# Patient Record
Sex: MALE | Race: WHITE | NOT HISPANIC OR LATINO | Employment: FULL TIME | URBAN - METROPOLITAN AREA
[De-identification: names, ages, dates, MRNs, and addresses within clinical notes are randomized per-mention and may not be internally consistent; named-entity substitution may affect disease eponyms.]

---

## 2020-06-24 ENCOUNTER — OFFICE VISIT (OUTPATIENT)
Dept: MEDICAL GROUP | Facility: PHYSICIAN GROUP | Age: 25
End: 2020-06-24
Payer: COMMERCIAL

## 2020-06-24 VITALS
DIASTOLIC BLOOD PRESSURE: 68 MMHG | WEIGHT: 144 LBS | HEIGHT: 71 IN | HEART RATE: 74 BPM | RESPIRATION RATE: 18 BRPM | TEMPERATURE: 98.6 F | OXYGEN SATURATION: 97 % | BODY MASS INDEX: 20.16 KG/M2 | SYSTOLIC BLOOD PRESSURE: 118 MMHG

## 2020-06-24 DIAGNOSIS — G89.29 CHRONIC PAIN OF RIGHT WRIST: ICD-10-CM

## 2020-06-24 DIAGNOSIS — Z13.228 SCREENING FOR ENDOCRINE, METABOLIC AND IMMUNITY DISORDER: ICD-10-CM

## 2020-06-24 DIAGNOSIS — Z76.89 ENCOUNTER TO ESTABLISH CARE: ICD-10-CM

## 2020-06-24 DIAGNOSIS — Z13.220 SCREENING FOR LIPID DISORDERS: ICD-10-CM

## 2020-06-24 DIAGNOSIS — Z13.0 SCREENING FOR ENDOCRINE, METABOLIC AND IMMUNITY DISORDER: ICD-10-CM

## 2020-06-24 DIAGNOSIS — Z00.00 PREVENTATIVE HEALTH CARE: ICD-10-CM

## 2020-06-24 DIAGNOSIS — M25.531 CHRONIC PAIN OF RIGHT WRIST: ICD-10-CM

## 2020-06-24 DIAGNOSIS — Z13.29 SCREENING FOR ENDOCRINE, METABOLIC AND IMMUNITY DISORDER: ICD-10-CM

## 2020-06-24 PROCEDURE — 99204 OFFICE O/P NEW MOD 45 MIN: CPT | Performed by: NURSE PRACTITIONER

## 2020-06-24 SDOH — HEALTH STABILITY: MENTAL HEALTH: HOW OFTEN DO YOU HAVE A DRINK CONTAINING ALCOHOL?: 2-3 TIMES A WEEK

## 2020-06-24 SDOH — HEALTH STABILITY: MENTAL HEALTH: HOW OFTEN DO YOU HAVE 6 OR MORE DRINKS ON ONE OCCASION?: NEVER

## 2020-06-24 SDOH — HEALTH STABILITY: MENTAL HEALTH: HOW MANY STANDARD DRINKS CONTAINING ALCOHOL DO YOU HAVE ON A TYPICAL DAY?: 5 OR 6

## 2020-06-24 ASSESSMENT — PATIENT HEALTH QUESTIONNAIRE - PHQ9: CLINICAL INTERPRETATION OF PHQ2 SCORE: 0

## 2020-06-24 NOTE — ASSESSMENT & PLAN NOTE
New problem to examiner.  Patient reports ongoing right wrist pain for 6 years.  The pain is daily.  The pain is worsened by movement and with his job as a .  He is not taking medication for this.  He is smoking marijuana daily which does help with the pain.  Denies any recent trauma or falls.  He does have history of right wrist fracture in 2014 and had surgery.  He reports that he still has 2 titanium plates and 10 screws in place.  He has not followed up with his orthopedic doctor since his surgery in 2014.

## 2020-06-24 NOTE — PROGRESS NOTES
Subjective:     CC:  Diagnoses of Encounter to establish care, Chronic pain of right wrist, Preventative health care, Screening for lipid disorders, and Screening for endocrine, metabolic and immunity disorder were pertinent to this visit.    HISTORY OF THE PRESENT ILLNESS: Patient is a 24 y.o. male. This pleasant patient is here today to establish care and discuss the following.  He has not had a PCP since he was with his pediatrician.    Chronic pain of right wrist  New problem to examiner.  Patient reports ongoing right wrist pain for 6 years.  The pain is daily.  The pain is worsened by movement and with his job as a .  He is not taking medication for this.  He is smoking marijuana daily which does help with the pain.  Denies any recent trauma or falls.  He does have history of right wrist fracture in  and had surgery.  He reports that he still has 2 titanium plates and 10 screws in place.  He has not followed up with his orthopedic doctor since his surgery in .    Allergies: Morphine    No current Epic-ordered outpatient medications on file.     No current Epic-ordered facility-administered medications on file.        Past Medical History:   Diagnosis Date   • Wound, open, trachea 2011       Past Surgical History:   Procedure Laterality Date   • WRIST ORIF  2014    Performed by Eliezer Ortega M.D. at SURGERY [facility]       Social History     Tobacco Use   • Smoking status: Former Smoker     Packs/day: 0.50     Years: 8.00     Pack years: 4.00     Types: Cigarettes     Last attempt to quit: 3/24/2020     Years since quittin.2   • Smokeless tobacco: Never Used   Substance Use Topics   • Alcohol use: Yes     Frequency: 2-3 times a week     Drinks per session: 5 or 6     Binge frequency: Never     Comment: no more than 6 pack of beer on weekends   • Drug use: Yes     Frequency: 7.0 times per week     Types: Inhaled, Marijuana     Comment: marijuana, helps with wrist  "      Social History     Social History Narrative    ** Merged History Encounter **         ** Merged History Encounter **            Family History   Problem Relation Age of Onset   • No Known Problems Mother    • No Known Problems Father    • No Known Problems Sister    • No Known Problems Brother    • Asthma Maternal Grandmother    • Other Maternal Grandmother         home oxygen   • Hypertension Maternal Grandfather    • Diabetes Maternal Grandfather    • Hyperlipidemia Maternal Grandfather    • No Known Problems Sister    • No Known Problems Brother    • No Known Problems Brother        Health Maintenance: due for vaccines    ROS:   Gen: no fevers/chills, no changes in weight  Eyes: no changes in vision  ENT: no sore throat, no ear pain  Pulm: no sob, no cough  CV: no chest pain, no palpitations  GI: no nausea/vomiting, no diarrhea  : no dysuria  MSk: no myalgias, +chronic right wrist pain  Skin: no rash  Neuro: no headaches, no dizziness  Heme/Lymph: no easy bruising      Objective:     Vital signs reviewed  Exam: /68 (BP Location: Left arm, Patient Position: Sitting, BP Cuff Size: Adult)   Pulse 74   Temp 37 °C (98.6 °F) (Temporal)   Resp 18   Ht 1.803 m (5' 11\")   Wt 65.3 kg (144 lb)   SpO2 97%  Body mass index is 20.08 kg/m².    General: Normal appearing. No distress.  HENT: Normocephalic. Ears normal shape and contour, canals are clear bilaterally, tympanic membranes are benign.  Eyes: Eyes conjunctiva clear lids without ptosis, pupils equal and reactive to light accommodation, lids normal.  Neck: Supple without JVD. Thyroid is not enlarged.  Pulmonary: Clear to ausculation.  Normal effort. No rales, ronchi, or wheezing.  Cardiovascular: Regular rate and rhythm without murmur. Radial pulses are intact and equal bilaterally.  Abdomen: Soft, nontender, nondistended. Normal bowel sounds. Liver and spleen are not palpable.  Neurologic: Grossly nonfocal  Lymph: No cervical or supraclavicular lymph " nodes are palpable  Skin: Warm and dry.  No obvious lesions. Healed right wrist surgical scar.  Musculoskeletal: Normal gait. No extremity cyanosis, clubbing, or edema. No pain with palpation to right wrist, no decreased ROM.   Psych: Normal mood and affect. Alert and oriented x3. Judgment and insight is normal.    Assessment & Plan:   24 y.o. male with the following -    1. Encounter to establish care  New problem to examiner. Care established. Patient would like labs ordered.     2. Chronic pain of right wrist  New problem to examiner. Check xray, last was 2014. Referral placed. Ay use OTC Tylenol and Ibuprofen for pain. No acute s/s today. Monitor and follow.   - DX-WRIST-LIMITED 2- RIGHT; Future  - REFERRAL TO ORTHOPEDICS    3. Preventative health care  New problem to examiner. Due for labs. Monitor and follow.   - CBC WITH DIFFERENTIAL; Future  - Comp Metabolic Panel; Future    4. Screening for lipid disorders  New problem to examiner. Screening indicated. Labs ordered. Monitor and follow-up via [facility].   - Lipid Profile; Future    5. Screening for endocrine, metabolic and immunity disorder  New problem to examiner. Screening indicated. Labs ordered. Monitor and follow-up via [facility].   - VITAMIN D,25 HYDROXY; Future  - TSH WITH REFLEX TO FT4; Future      Return in about 1 year (around 6/24/2021), or pending labs.    Please note that this dictation was created using voice recognition software. I have made every reasonable attempt to correct obvious errors, but I expect that there are errors of grammar and possibly content that I did not discover before finalizing the note.

## 2022-09-11 ENCOUNTER — HOSPITAL ENCOUNTER (EMERGENCY)
Facility: MEDICAL CENTER | Age: 27
End: 2022-09-11
Attending: EMERGENCY MEDICINE
Payer: COMMERCIAL

## 2022-09-11 VITALS
DIASTOLIC BLOOD PRESSURE: 70 MMHG | SYSTOLIC BLOOD PRESSURE: 142 MMHG | OXYGEN SATURATION: 98 % | BODY MASS INDEX: 20.96 KG/M2 | HEIGHT: 71 IN | TEMPERATURE: 98.2 F | RESPIRATION RATE: 16 BRPM | WEIGHT: 149.69 LBS | HEART RATE: 74 BPM

## 2022-09-11 DIAGNOSIS — K08.89 PAIN, DENTAL: ICD-10-CM

## 2022-09-11 PROCEDURE — 700102 HCHG RX REV CODE 250 W/ 637 OVERRIDE(OP): Performed by: EMERGENCY MEDICINE

## 2022-09-11 PROCEDURE — 99283 EMERGENCY DEPT VISIT LOW MDM: CPT

## 2022-09-11 PROCEDURE — A9270 NON-COVERED ITEM OR SERVICE: HCPCS | Performed by: EMERGENCY MEDICINE

## 2022-09-11 RX ORDER — HYDROCODONE BITARTRATE AND ACETAMINOPHEN 5; 325 MG/1; MG/1
1 TABLET ORAL EVERY 6 HOURS PRN
Qty: 12 TABLET | Refills: 0 | Status: SHIPPED | OUTPATIENT
Start: 2022-09-11 | End: 2022-09-14

## 2022-09-11 RX ORDER — PENICILLIN V POTASSIUM 500 MG/1
500 TABLET ORAL ONCE
Status: COMPLETED | OUTPATIENT
Start: 2022-09-11 | End: 2022-09-11

## 2022-09-11 RX ORDER — IBUPROFEN 800 MG/1
800 TABLET ORAL EVERY 8 HOURS PRN
Qty: 15 TABLET | Refills: 0 | Status: SHIPPED | OUTPATIENT
Start: 2022-09-11

## 2022-09-11 RX ORDER — OXYCODONE HYDROCHLORIDE AND ACETAMINOPHEN 5; 325 MG/1; MG/1
1 TABLET ORAL ONCE
Status: COMPLETED | OUTPATIENT
Start: 2022-09-11 | End: 2022-09-11

## 2022-09-11 RX ORDER — PENICILLIN V POTASSIUM 500 MG/1
500 TABLET ORAL 4 TIMES DAILY
Qty: 40 TABLET | Refills: 0 | Status: SHIPPED | OUTPATIENT
Start: 2022-09-11

## 2022-09-11 RX ADMIN — PENICILLIN V POTASSIUM 500 MG: 500 TABLET, FILM COATED ORAL at 16:57

## 2022-09-11 RX ADMIN — OXYCODONE AND ACETAMINOPHEN 1 TABLET: 5; 325 TABLET ORAL at 16:57

## 2022-09-11 NOTE — ED NOTES
Patient from lobby to [facility] ambulatory with steady gait accompanied by ED Tech. Chart up for ERP.

## 2022-09-11 NOTE — ED TRIAGE NOTES
"Chief Complaint   Patient presents with    Jaw Pain     Started Friday. R sided jaw pain. Denies fever. 10/10 pain.    Dental Pain     Bottom right dental pain. Per pt, \"painful to chew or swallow anything\".     Swelling on r jaw noted. Pt able to manage secretions.    BP (!) 146/73   Pulse 69   Temp 36.7 °C (98.1 °F) (Temporal)   Resp 14   Ht 1.803 m (5' 11\")   Wt 67.9 kg (149 lb 11.1 oz)   SpO2 98%   BMI 20.88 kg/m²     "

## 2022-09-12 NOTE — ED PROVIDER NOTES
"ED Provider Note    CHIEF COMPLAINT  Chief Complaint   Patient presents with    Jaw Pain     Started Friday. R sided jaw pain. Denies fever. 10/10 pain.    Dental Pain     Bottom right dental pain. Per pt, \"painful to chew or swallow anything\".       HPI  Quirino Miranda is a 27 y.o. male here for evaluation of right-sided jaw pain.  Patient states he has had some swelling to the right lower jaw, over the last couple of days.  He states he does have a broken tooth, but states he has a lot of pain when he chews or swallows.  Patient has no fever chills or vomiting, he is able to open close now without difficulty.  He denies any trauma.  Patient states he has a dentist appointment in the morning.  He is not taking anything prior to arrival for the pain or discomfort.    ROS;  Please see HPI  O/W negative     PAST MEDICAL HISTORY   has a past medical history of Wound, open, trachea (2011).    SOCIAL HISTORY  Social History     Tobacco Use    Smoking status: Former     Packs/day: 0.50     Years: 8.00     Pack years: 4.00     Types: Cigarettes     Quit date: 3/24/2020     Years since quittin.4    Smokeless tobacco: Never   Vaping Use    Vaping Use: Every day   Substance and Sexual Activity    Alcohol use: Yes     Comment: occasionally    Drug use: Not Currently     Frequency: 7.0 times per week     Types: Inhaled, Marijuana     Comment: marijuana, helps with wrist    Sexual activity: Yes     Partners: Female       SURGICAL HISTORY   has a past surgical history that includes orif, wrist (2014).    CURRENT MEDICATIONS  Home Medications       Reviewed by Joe Veronica R.N. (Registered Nurse) on 22 at 1546  Med List Status: Partial     Medication Last Dose Status        Patient Timur Taking any Medications                           ALLERGIES  Allergies   Allergen Reactions    Morphine Shortness of Breath, Nausea and Swelling       REVIEW OF SYSTEMS  See HPI for further details. Review of systems as " "above, otherwise all other systems are negative.     PHYSICAL EXAM  VITAL SIGNS: BP (!) 142/70   Pulse 74   Temp 36.8 °C (98.2 °F)   Resp 16   Ht 1.803 m (5' 11\")   Wt 67.9 kg (149 lb 11.1 oz)   SpO2 98%   BMI 20.88 kg/m²     Constitutional: Well developed, well nourished. No acute distress.  HEENT: Normocephalic, atraumatic. MMM.  Right lower jaw with mild edema and tenderness to palpation.  Dental caries noted, no abscess.  Neck: Supple, Full range of motion   Chest/Pulmonary:  No respiratory distress.  Equal expansion   Musculoskeletal: No deformity, no edema, neurovascular intact.   Neuro: Clear speech, appropriate, cooperative, cranial nerves II-XII grossly intact.  Psych: Normal mood and affect      PROCEDURES     MEDICAL RECORD  I have reviewed patient's medical record and pertinent results are listed.    COURSE & MEDICAL DECISION MAKING  I have reviewed any medical record information, laboratory studies and radiographic results as noted above.    Patient will be started on antibiotics here, in addition to being given something for pain.  He has a dentist appointment tomorrow morning, and will keep this.  He will also come back or return for any further issues or concerns.  He is nontoxic-appearing and afebrile.  He is tolerating secretions well.    I you have had any blood pressure issues while here in the emergency department, please see your doctor for a further evaluation or work up.    Differential diagnoses include but not limited to: Abscess, cellulitis, dental pain    This patient presents with dental caries.  At this time, I have counseled the patient/family regarding their medications, pain control, and follow up.  They will continue their medications, if any, as prescribed.  They will return immediately for any worsening symptoms and/or any other medical concerns.  They will see their doctor, or contact the doctor provided, in 1-2 days for follow up.       FINAL IMPRESSION  Dental " caries      Electronically signed by: Raheem Griffith D.O., 9/11/2022 5:24 PM

## 2023-11-27 ENCOUNTER — OFFICE VISIT (OUTPATIENT)
Dept: URGENT CARE | Facility: PHYSICIAN GROUP | Age: 28
End: 2023-11-27
Payer: COMMERCIAL

## 2023-11-27 ENCOUNTER — APPOINTMENT (OUTPATIENT)
Dept: RADIOLOGY | Facility: IMAGING CENTER | Age: 28
End: 2023-11-27
Attending: FAMILY MEDICINE
Payer: COMMERCIAL

## 2023-11-27 VITALS
WEIGHT: 148 LBS | SYSTOLIC BLOOD PRESSURE: 114 MMHG | DIASTOLIC BLOOD PRESSURE: 62 MMHG | RESPIRATION RATE: 16 BRPM | TEMPERATURE: 98.2 F | OXYGEN SATURATION: 97 % | HEART RATE: 78 BPM | HEIGHT: 71 IN | BODY MASS INDEX: 20.72 KG/M2

## 2023-11-27 DIAGNOSIS — S16.1XXA ACUTE STRAIN OF NECK MUSCLE, INITIAL ENCOUNTER: ICD-10-CM

## 2023-11-27 PROCEDURE — 3074F SYST BP LT 130 MM HG: CPT | Performed by: FAMILY MEDICINE

## 2023-11-27 PROCEDURE — 3078F DIAST BP <80 MM HG: CPT | Performed by: FAMILY MEDICINE

## 2023-11-27 PROCEDURE — 72040 X-RAY EXAM NECK SPINE 2-3 VW: CPT | Mod: TC,FY | Performed by: FAMILY MEDICINE

## 2023-11-27 PROCEDURE — 99203 OFFICE O/P NEW LOW 30 MIN: CPT | Performed by: FAMILY MEDICINE

## 2023-11-27 RX ORDER — IBUPROFEN 200 MG
800 TABLET ORAL ONCE
Status: COMPLETED | OUTPATIENT
Start: 2023-11-27 | End: 2023-11-27

## 2023-11-27 RX ORDER — CYCLOBENZAPRINE HCL 5 MG
5-10 TABLET ORAL EVERY 8 HOURS PRN
Qty: 30 TABLET | Refills: 0 | Status: SHIPPED | OUTPATIENT
Start: 2023-11-27

## 2023-11-27 RX ADMIN — Medication 800 MG: at 19:35
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
